# Patient Record
Sex: MALE | Race: WHITE
[De-identification: names, ages, dates, MRNs, and addresses within clinical notes are randomized per-mention and may not be internally consistent; named-entity substitution may affect disease eponyms.]

---

## 2018-02-13 ENCOUNTER — HOSPITAL ENCOUNTER (EMERGENCY)
Dept: HOSPITAL 25 - ED | Age: 28
Discharge: HOME | End: 2018-02-13
Payer: COMMERCIAL

## 2018-02-13 VITALS — SYSTOLIC BLOOD PRESSURE: 105 MMHG | DIASTOLIC BLOOD PRESSURE: 92 MMHG

## 2018-02-13 DIAGNOSIS — R10.84: Primary | ICD-10-CM

## 2018-02-13 LAB
BASOPHILS # BLD AUTO: 0 10^3/UL (ref 0–0.2)
EOSINOPHIL # BLD AUTO: 0.1 10^3/UL (ref 0–0.6)
HCT VFR BLD AUTO: 41 % (ref 42–52)
HGB BLD-MCNC: 13.9 G/DL (ref 14–18)
LYMPHOCYTES # BLD AUTO: 1.2 10^3/UL (ref 1–4.8)
MCH RBC QN AUTO: 28 PG (ref 27–31)
MCHC RBC AUTO-ENTMCNC: 34 G/DL (ref 31–36)
MCV RBC AUTO: 84 FL (ref 80–94)
MONOCYTES # BLD AUTO: 0.6 10^3/UL (ref 0–0.8)
NEUTROPHILS # BLD AUTO: 11.6 10^3/UL (ref 1.5–7.7)
NRBC # BLD AUTO: 0 10^3/UL
NRBC BLD QL AUTO: 0
PLATELET # BLD AUTO: 229 10^3/UL (ref 150–450)
RBC # BLD AUTO: 4.94 10^6/UL (ref 4–5.4)
WBC # BLD AUTO: 13.5 10^3/UL (ref 3.5–10.8)

## 2018-02-13 PROCEDURE — 74176 CT ABD & PELVIS W/O CONTRAST: CPT

## 2018-02-13 PROCEDURE — 99282 EMERGENCY DEPT VISIT SF MDM: CPT

## 2018-02-13 PROCEDURE — 86141 C-REACTIVE PROTEIN HS: CPT

## 2018-02-13 PROCEDURE — 80053 COMPREHEN METABOLIC PANEL: CPT

## 2018-02-13 PROCEDURE — 36415 COLL VENOUS BLD VENIPUNCTURE: CPT

## 2018-02-13 PROCEDURE — 85025 COMPLETE CBC W/AUTO DIFF WBC: CPT

## 2018-02-13 PROCEDURE — 81015 MICROSCOPIC EXAM OF URINE: CPT

## 2018-02-13 PROCEDURE — 81003 URINALYSIS AUTO W/O SCOPE: CPT

## 2018-02-13 PROCEDURE — 83690 ASSAY OF LIPASE: CPT

## 2018-02-13 NOTE — RAD
CLINICAL HISTORY: Right flank pain, right-sided abdominal pain



COMPARISON: None



TECHNIQUE: Multiple contiguous axial CT scans were obtained of the abdomen and pelvis,

without intravenous contrast enhancement. Coronal and sagittal multiplanar reformations

are submitted for review.  Oral contrast was not administered.     



FINDINGS: 

The study is limited by the lack of intravenous contrast. This limits evaluation of the

solid organs and vasculature.





LUNG BASES: The lung bases are clear.



LIVER: The liver is normal in shape, size, contour, and attenuation.

BILE DUCTS: There is no intrahepatic or extrahepatic biliary dilatation.

GALLBLADDER: The gallbladder is normal, without pericholecystic inflammatory change.



PANCREAS: The pancreas is normal, without mass or ductal dilatation.

SPLEEN: Normal in size and appearance.



UPPER GI TRACT: Evaluation of the gastrointestinal tract is limited by incomplete gastric

distention. The upper GI tract is unremarkable.

SMALL BOWEL AND MESENTERY: The small bowel is normal in contour, course, and caliber.

There is no obstruction or dilatation.

COLON: The colon is normal in contour, course, caliber. There is no pericolonic

inflammatory change. There is a tubular, vermiform, hollow viscus that is blind ending,

and originates from the cecum, consistent with a normal appendix. There is no

periappendiceal inflammatory change. This is seen on coronal images 43.52.



ADRENALS: Normal bilaterally.

KIDNEYS: The kidneys are normal in shape, size, contour, and axis. There is no

hydronephrosis or nephrolithiasis.

BLADDER: The bladder is smooth in contour.



PELVIC ORGANS: The prostate gland is normal. The seminal vesicles are symmetric.



AORTA: The aorta is normal.

IVC: Unremarkable



LYMPH NODES: There is no lymphadenopathy by size criteria.



ABDOMINAL WALL: There is no evidence for abdominal wall hernia.

BONES AND SOFT TISSUES: The bones and soft tissues are unremarkable.

OTHER: None



IMPRESSION:

NO HYDRONEPHROSIS OR NEPHROLITHIASIS. NORMAL APPENDIX.

## 2018-02-13 NOTE — ED
GI/ HPI





- HPI Summary


HPI Summary: 


27-year-old female presents with right flank pain and abdominal pain today.  He 

states that it started in his right flank for the past couple hours and then 

moved to his right lower quadrant.  He denies any pain for the past hour.  He 

states he had normal appetite today.  He denies any nausea vomiting or 

diarrhea.  He denies any fevers.  He states he has had this pain before.  He 

denies a history of kidney stones.  Denies any blood in his urine.  Denies any 

dysuria.  He hasn't taken anything for his pain.








- History of Current Complaint


Chief Complaint: EDAbdPain


Time Seen by Provider: 02/13/18 11:49


Stated Complaint: ABD PAIN


Pain Intensity: 0





PMH/Surg Hx/FS Hx/Imm Hx


Endocrine/Hematology History: 


   Denies: Hx Anticoagulant Therapy


Cardiovascular History: 


   Denies: Hx Hypertension


Infectious Disease History: No


Infectious Disease History: 


   Denies: Traveled Outside the [place] in Last 30 Days





- Family History


Known Family History: 


   Negative: Renal Disease





- Social History


Alcohol Use: None


Substance Use Type: Reports: None


Smoking Status (MU): Never Smoked Tobacco





Review of Systems


Negative: Fever


Negative: Chest Pain


Negative: Shortness Of Breath


Positive: Abdominal Pain.  Negative: Vomiting, Diarrhea, Nausea


All Other Systems Reviewed And Are Negative: Yes





Physical Exam


Triage Information Reviewed: Yes


Vital Signs On Initial Exam: 


 Initial Vitals











Temp Pulse Resp BP Pulse Ox


 


 98.1 F   80   14   148/78   98 


 


 02/13/18 11:39  02/13/18 11:39  02/13/18 11:39  02/13/18 11:39  02/13/18 11:39











Vital Signs Reviewed: Yes


Appearance: Positive: Well-Appearing


Skin: Positive: Warm, Dry


Head/Face: Positive: Normal Head/Face Inspection


Eyes: Positive: Normal, Conjunctiva Clear


Respiratory/Lung Sounds: Positive: Clear to Auscultation, Breath Sounds Present


Cardiovascular: Positive: Normal, RRR


Abdomen Description: Positive: Nontender, Soft.  Negative: CVA Tenderness (R), 

CVA Tenderness (L)


Bowel Sounds: Positive: Present


Musculoskeletal: Positive: Normal


Neurological: Positive: Normal


Psychiatric: Positive: Normal





Diagnostics





- Vital Signs


 Vital Signs











  Temp Pulse Resp BP Pulse Ox


 


 02/13/18 11:39  98.1 F  80  14  148/78  98














- Laboratory


Result Diagrams: 


 02/13/18 12:00





 02/13/18 12:00


Lab Statement: Any lab studies that have been ordered have been reviewed, and 

results considered in the medical decision making process.





- CT


  ** abd


CT Interpretation: No Acute Changes


CT Interpretation Completed By: Radiologist





CARLOS A Course/Dx





- Course


Course Of Treatment: 27-year-old female presents with right flank pain and 

abdominal pain today.  He states that it started in his right flank for the 

past couple hours and then moved to his right lower quadrant.  He denies any 

pain for the past hour.  He states he had normal appetite today.  He denies any 

nausea vomiting or diarrhea.  He denies any fevers.  He states he has had this 

pain before.  He denies a history of kidney stones.  Denies any blood in his 

urine.  Denies any dysuria.  He hasn't taken anything for his pain.  on exam 

abdomen soft nontender. neg CVA tenderness. labs wbc 13, crp normal. urine 

shows blood. CT normal. with history of traveling back pain to [facility] and blood in 

urine suspect that passed a stone. patient understand and agrees with plan.





- Diagnoses


Differential Diagnoses - Male: Pyelonephritis, Ureteral Calculi, Urinary Tract 

Infection


Provider Diagnoses: 


 Right flank pain, Abdominal pain








Discharge





- Discharge Plan


Condition: Good


Disposition: HOME


Patient Education Materials:  Flank Pain (ED)


Referrals: 


No Primary Care Phys,NOPCP [Primary Care Provider] - 


Additional Instructions: 


Drink fluids as tolerated


Take ibuprofen or Tylenol for pain as needed every 6 hours


Follow up with primary within 5 days


Return to ED if develop any new or worsening symptoms